# Patient Record
Sex: MALE | Race: WHITE | NOT HISPANIC OR LATINO | Employment: FULL TIME | URBAN - METROPOLITAN AREA
[De-identification: names, ages, dates, MRNs, and addresses within clinical notes are randomized per-mention and may not be internally consistent; named-entity substitution may affect disease eponyms.]

---

## 2023-07-17 PROBLEM — R05.9 COUGH: Status: ACTIVE | Noted: 2023-07-17

## 2023-07-17 PROBLEM — R06.00 DYSPNEA: Status: ACTIVE | Noted: 2023-07-17

## 2023-07-17 PROBLEM — F17.200 TOBACCO DEPENDENCE: Status: ACTIVE | Noted: 2023-07-17

## 2023-07-17 PROBLEM — J44.9 COPD (CHRONIC OBSTRUCTIVE PULMONARY DISEASE): Status: ACTIVE | Noted: 2023-07-17

## 2023-07-26 ENCOUNTER — OFFICE VISIT (OUTPATIENT)
Dept: FAMILY MEDICINE CLINIC | Facility: CLINIC | Age: 48
End: 2023-07-26
Payer: COMMERCIAL

## 2023-07-26 VITALS
OXYGEN SATURATION: 98 % | WEIGHT: 157.8 LBS | DIASTOLIC BLOOD PRESSURE: 78 MMHG | SYSTOLIC BLOOD PRESSURE: 100 MMHG | BODY MASS INDEX: 22.59 KG/M2 | HEIGHT: 70 IN | HEART RATE: 76 BPM

## 2023-07-26 DIAGNOSIS — Z76.89 ENCOUNTER TO ESTABLISH CARE: Primary | ICD-10-CM

## 2023-07-26 DIAGNOSIS — F17.200 TOBACCO DEPENDENCE: ICD-10-CM

## 2023-07-26 DIAGNOSIS — Z09 HOSPITAL DISCHARGE FOLLOW-UP: ICD-10-CM

## 2023-07-26 DIAGNOSIS — J44.9 CHRONIC OBSTRUCTIVE PULMONARY DISEASE, UNSPECIFIED COPD TYPE: ICD-10-CM

## 2023-07-26 DIAGNOSIS — J18.9 PNEUMONIA DUE TO INFECTIOUS ORGANISM, UNSPECIFIED LATERALITY, UNSPECIFIED PART OF LUNG: ICD-10-CM

## 2023-07-26 RX ORDER — NICOTINE 21 MG/24HR
1 PATCH, TRANSDERMAL 24 HOURS TRANSDERMAL EVERY 24 HOURS
Qty: 28 EACH | Refills: 0 | Status: SHIPPED | OUTPATIENT
Start: 2023-07-26

## 2023-07-26 RX ORDER — TIOTROPIUM BROMIDE INHALATION SPRAY 3.12 UG/1
2 SPRAY, METERED RESPIRATORY (INHALATION)
Qty: 8 G | Refills: 0 | COMMUNITY
Start: 2023-07-26

## 2023-07-26 NOTE — PROGRESS NOTES
Transitional Care Follow Up Visit  Subjective     Akash Lundy is a 48 y.o. male who presents for a transitional care management visit.    Within 48 business hours after discharge our office contacted him via telephone to coordinate his care and needs.      I reviewed and discussed the details of that call along with the discharge summary, hospital problems, inpatient lab results, inpatient diagnostic studies, and consultation reports with Akash.     Current outpatient and discharge medications have been reconciled for the patient.        7/18/2023     7:39 PM   Date of TCM Phone Call   [facility]   Date of Admission 7/17/2023   Date of Discharge 7/18/2023     Risk for Readmission (LACE) Score: 3 (7/18/2023  6:00 AM)      History of Present Illness  Hospital Course:  Akash Lundy is a 48 y.o. male with tobacco abuse and COPD, uses albuterol inhaler several times daily.   He presents with 2 weeks of stabbing R chest pain that waxes and wanes and is unrelated to movement.  Imaging showed R lung bleb with fluid likely sterile with  adjacent infiltrate, likely pneumonia.  Previously treated with Levaquin daily for 10 days as outpatient.  Seen by pulmonology and started on Augmentin x 30 days, also doxy x 10 days with follow-up in pulmonology clinic in 3 weeks.  Prescribed Spiriva, Symbicort and nicotine patches.    Patient presents for hospital follow-up today.  He was hospitalized at [facility] from 07/17 to 07/18.  He is present with his significant other today.  Patient reports that right sided chest discomfort has improved.  Denies shortness of breath or cough.  Compliant on all medications.  Has not smoked a cigarette since 07/17.  Not wearing patches regularly.  Unable to afford Symbicort.  Has samples for Spiriva and is using this.           Duration of Hospital Stay:  07/17 to 07/18     The following portions of the patient's history were reviewed and updated as appropriate: allergies, current  medications, past family history, past medical history, past social history, past surgical history, and problem list.    Current outpatient and discharge medications have been reconciled for the patient.  Reviewed by: Rosanne Lynch PA-C      Review of Systems   Constitutional:  Negative for activity change, appetite change, chills, diaphoresis, fatigue, fever, unexpected weight gain and unexpected weight loss.   HENT:  Negative for congestion, dental problem, ear pain, hearing loss, nosebleeds, sinus pressure, sore throat and trouble swallowing.    Eyes:  Negative for blurred vision, pain, redness and visual disturbance.   Respiratory:  Negative for apnea, cough, chest tightness, shortness of breath and wheezing.    Cardiovascular:  Positive for chest pain. Negative for palpitations and leg swelling.   Gastrointestinal:  Negative for abdominal distention, abdominal pain, anal bleeding, blood in stool, constipation, diarrhea, nausea, vomiting, GERD and indigestion.   Endocrine: Negative for cold intolerance, heat intolerance, polydipsia, polyphagia and polyuria.   Genitourinary:  Negative for decreased urine volume, difficulty urinating, dysuria, frequency, hematuria, urgency and urinary incontinence.   Musculoskeletal:  Positive for arthralgias and myalgias. Negative for gait problem, joint swelling and bursitis.   Skin:  Negative for dry skin, rash, skin lesions and wound.   Neurological:  Negative for dizziness, tremors, seizures, syncope, speech difficulty, weakness, light-headedness, headache, memory problem and confusion.   Hematological:  Does not bruise/bleed easily.   Psychiatric/Behavioral:  Negative for agitation, behavioral problems, decreased concentration, hallucinations, sleep disturbance, suicidal ideas, depressed mood and stress. The patient is not nervous/anxious.      Current Outpatient Medications on File Prior to Visit   Medication Sig Dispense Refill    albuterol sulfate  (90  Base) MCG/ACT inhaler Inhale 2 puffs Every 4 (Four) Hours As Needed for Wheezing or Shortness of Air. 6.7 g 11    amoxicillin-clavulanate (AUGMENTIN) 875-125 MG per tablet Take 1 tablet by mouth 2 (Two) Times a Day. 60 tablet 0    doxycycline (VIBRAMYCIN) 100 MG capsule Take 1 capsule by mouth 2 (Two) Times a Day for 10 days. 20 capsule 0    tiotropium (SPIRIVA) 18 MCG per inhalation capsule Place 2 capsules into inhaler and inhale 2 (Two) Times a Day. Sample from hospital, very expensive      [DISCONTINUED] nicotine (NICODERM CQ) 21 MG/24HR patch Place 1 patch on the skin as directed by provider Daily. 30 each 1    [DISCONTINUED] budesonide-formoterol (Symbicort) 160-4.5 MCG/ACT inhaler Inhale 2 puffs 2 (Two) Times a Day. 1 each 12     No current facility-administered medications on file prior to visit.       Results for orders placed or performed during the hospital encounter of 07/17/23   Blood Culture - Blood, Arm, Left    Specimen: Arm, Left; Blood   Result Value Ref Range    Blood Culture No growth at 5 days    Blood Culture - Blood, Hand, Right    Specimen: Hand, Right; Blood   Result Value Ref Range    Blood Culture No growth at 5 days    MRSA Screen, PCR (Inpatient) - Swab, Nares    Specimen: Nares; Swab   Result Value Ref Range    MRSA PCR Negative Negative   Comprehensive Metabolic Panel    Specimen: Blood   Result Value Ref Range    Glucose 141 (H) 65 - 99 mg/dL    BUN 12 6 - 20 mg/dL    Creatinine 0.77 0.76 - 1.27 mg/dL    Sodium 139 136 - 145 mmol/L    Potassium 4.4 3.5 - 5.2 mmol/L    Chloride 101 98 - 107 mmol/L    CO2 27.0 22.0 - 29.0 mmol/L    Calcium 8.8 8.6 - 10.5 mg/dL    Total Protein 6.5 6.0 - 8.5 g/dL    Albumin 4.0 3.5 - 5.2 g/dL    ALT (SGPT) 21 1 - 41 U/L    AST (SGOT) 17 1 - 40 U/L    Alkaline Phosphatase 92 39 - 117 U/L    Total Bilirubin 0.2 0.0 - 1.2 mg/dL    Globulin 2.5 gm/dL    A/G Ratio 1.6 g/dL    BUN/Creatinine Ratio 15.6 7.0 - 25.0    Anion Gap 11.0 5.0 - 15.0 mmol/L    eGFR  "109.7 >60.0 mL/min/1.73   BNP    Specimen: Blood   Result Value Ref Range    proBNP <36.0 0.0 - 450.0 pg/mL   Single High Sensitivity Troponin T    Specimen: Blood   Result Value Ref Range    HS Troponin T 12 <15 ng/L   CBC Auto Differential    Specimen: Blood   Result Value Ref Range    WBC 10.69 3.40 - 10.80 10*3/mm3    RBC 4.30 4.14 - 5.80 10*6/mm3    Hemoglobin 12.5 (L) 13.0 - 17.7 g/dL    Hematocrit 39.5 37.5 - 51.0 %    MCV 91.9 79.0 - 97.0 fL    MCH 29.1 26.6 - 33.0 pg    MCHC 31.6 31.5 - 35.7 g/dL    RDW 12.0 (L) 12.3 - 15.4 %    RDW-SD 40.8 37.0 - 54.0 fl    MPV 8.8 6.0 - 12.0 fL    Platelets 342 140 - 450 10*3/mm3    Neutrophil % 71.5 42.7 - 76.0 %    Lymphocyte % 18.1 (L) 19.6 - 45.3 %    Monocyte % 6.5 5.0 - 12.0 %    Eosinophil % 2.6 0.3 - 6.2 %    Basophil % 0.6 0.0 - 1.5 %    Immature Grans % 0.7 (H) 0.0 - 0.5 %    Neutrophils, Absolute 7.63 (H) 1.70 - 7.00 10*3/mm3    Lymphocytes, Absolute 1.94 0.70 - 3.10 10*3/mm3    Monocytes, Absolute 0.70 0.10 - 0.90 10*3/mm3    Eosinophils, Absolute 0.28 0.00 - 0.40 10*3/mm3    Basophils, Absolute 0.06 0.00 - 0.20 10*3/mm3    Immature Grans, Absolute 0.08 (H) 0.00 - 0.05 10*3/mm3    nRBC 0.0 0.0 - 0.2 /100 WBC   Lactic Acid, Plasma    Specimen: Blood   Result Value Ref Range    Lactate 1.4 0.5 - 2.0 mmol/L   ECG 12 Lead ED Triage Standing Order; SOA   Result Value Ref Range    QT Interval 398 ms    QTC Interval 447 ms   Green Top (Gel)   Result Value Ref Range    Extra Tube Hold for add-ons.    Lavender Top   Result Value Ref Range    Extra Tube hold for add-on    Gold Top - SST   Result Value Ref Range    Extra Tube Hold for add-ons.    Gray Top   Result Value Ref Range    Extra Tube Hold for add-ons.    Light Blue Top   Result Value Ref Range    Extra Tube Hold for add-ons.        Visit Vitals  /78   Pulse 76   Ht 177.8 cm (70\")   Wt 71.6 kg (157 lb 12.8 oz)   SpO2 98%   BMI 22.64 kg/m²     Body mass index is 22.64 kg/m².    Objective   Physical " Exam  Vitals reviewed.   Constitutional:       General: He is not in acute distress.     Appearance: Normal appearance. He is well-developed and normal weight. He is not ill-appearing or diaphoretic.   HENT:      Head: Normocephalic and atraumatic.   Eyes:      Extraocular Movements: Extraocular movements intact.      Conjunctiva/sclera: Conjunctivae normal.   Cardiovascular:      Rate and Rhythm: Normal rate and regular rhythm.      Heart sounds: Normal heart sounds.   Pulmonary:      Effort: Pulmonary effort is normal. No respiratory distress.      Breath sounds: Decreased breath sounds present.   Musculoskeletal:         General: Normal range of motion.      Cervical back: Normal range of motion.      Right lower leg: No edema.      Left lower leg: No edema.   Skin:     General: Skin is warm.      Findings: No erythema or rash.   Neurological:      General: No focal deficit present.      Mental Status: He is alert.   Psychiatric:         Attention and Perception: He is attentive.         Mood and Affect: Mood normal.         Speech: Speech normal.         Behavior: Behavior normal. Behavior is cooperative.         Thought Content: Thought content normal.         Judgment: Judgment normal.       Assessment & Plan   Diagnoses and all orders for this visit:    1. New patient (Primary)    2. Hospital FU    3. Pneumonia due to infectious organism, unspecified laterality, unspecified part of lung  Compliant on Augmentin and Doxycycline.  Feeling better overall.  VSS.  Denies SOB or cough.  Keep appointment with pulmonology on 08/08.    4. Chronic obstructive pulmonary disease, unspecified COPD type  -     tiotropium bromide monohydrate (Spiriva Respimat) 2.5 MCG/ACT aerosol solution inhaler; Inhale 2 puffs Daily.  Dispense: 8 g; Refill: 0  Samples of Spiriva given to patient.  Continue to use Spiriva 2 puffs daily.  Unable to afford symbicort.    5. Tobacco dependence  -     nicotine (Nicoderm CQ) 14 MG/24HR patch;  Place 1 patch on the skin as directed by provider Daily.  Dispense: 28 each; Refill: 0  -     nicotine (NICODERM CQ) 7 MG/24HR patch; Place 1 patch on the skin as directed by provider Daily.  Dispense: 28 patch; Refill: 0  Has not smoked since 07/17 which is great.  Recommend he wear patches daily to help with long-term success of quitting smoking.  Will send in next 2 doses of patches as well.             Dictated Utilizing Dragon Dictation     Please note that portions of this note were completed with a voice recognition program.     Part of this note may be an electronic transcription/translation of spoken language to printed text using the Dragon Dictation System.

## 2023-08-08 ENCOUNTER — OFFICE VISIT (OUTPATIENT)
Dept: PULMONOLOGY | Facility: CLINIC | Age: 48
End: 2023-08-08
Payer: COMMERCIAL

## 2023-08-08 VITALS
BODY MASS INDEX: 23.23 KG/M2 | TEMPERATURE: 97.6 F | HEART RATE: 65 BPM | DIASTOLIC BLOOD PRESSURE: 78 MMHG | OXYGEN SATURATION: 99 % | SYSTOLIC BLOOD PRESSURE: 110 MMHG | WEIGHT: 161.9 LBS

## 2023-08-08 DIAGNOSIS — F17.200 TOBACCO DEPENDENCE: ICD-10-CM

## 2023-08-08 DIAGNOSIS — J43.9 EMPHYSEMATOUS BLEB: Primary | ICD-10-CM

## 2023-08-08 DIAGNOSIS — J44.9 CHRONIC OBSTRUCTIVE PULMONARY DISEASE, UNSPECIFIED COPD TYPE: ICD-10-CM

## 2023-08-08 RX ORDER — FLUTICASONE FUROATE, UMECLIDINIUM BROMIDE AND VILANTEROL TRIFENATATE 100; 62.5; 25 UG/1; UG/1; UG/1
1 POWDER RESPIRATORY (INHALATION)
Qty: 1 EACH | Refills: 0 | COMMUNITY
Start: 2023-08-08

## 2023-08-08 NOTE — PROGRESS NOTES
[facility] Pulmonary Evaluation    Chief Complaint  No chief complaint on file.    Referring Provider:     Kelsea          Akash Lundy presents to [facility] PULMONARY [facility]ARE MEDICINE for posthospitalization follow-up.  He was seen in the hospital by Dr. Medel.  He was admitted for a COPD exacerbation and possible pneumonia.  A CT scan chest did show a large fluid-filled bulla.  The plan was for a course of antibiotics, Augmentin for 4 weeks, to treat infection and follow-up CT scan.      At discharge she was also prescribed Symbicort and Spiriva, unfortunately Symbicort was over $300 and he had to get samples of the Spiriva.  He has been using the Spiriva daily.  He is also been using his albuterol on occasion.    He has not been smoking for 2 weeks.  He has been using nicotine patches.  He does feel like his symptoms is also improved after quitting smoking.    He does feel like his chest pain is better.  He has had no cough or sputum production.  He denies significant dyspnea with activity.  No fevers or chills.  No hemoptysis.      He does have a history of a hospitalization many years ago with shortness of breath and was told he had chronic lung changes from fiberglass exposure.  He worked in the HVAC industry for years.      Objective     Vital Signs:   /78 (BP Location: Right arm, Patient Position: Sitting)   Pulse 65   Temp 97.6 øF (36.4 øC) (Temporal)   Wt 73.4 kg (161 lb 14.4 oz)   SpO2 99% Comment: room air  BMI 23.23 kg/mý       Immunization History   Administered Date(s) Administered    COVID-19 (PFIZER) Purple Cap Monovalent 08/11/2021, 09/17/2021       Physical Exam  Vitals reviewed.   Constitutional:       Appearance: He is well-developed.   HENT:      Head: Normocephalic and atraumatic.   Eyes:      Pupils: Pupils are equal, round, and reactive to light.   Cardiovascular:      Rate and Rhythm: Normal rate and regular rhythm.      Heart sounds: No murmur  heard.  Pulmonary:      Effort: Pulmonary effort is normal. No respiratory distress.      Breath sounds: Normal breath sounds. No wheezing or rales.   Abdominal:      General: Bowel sounds are normal. There is no distension.      Palpations: Abdomen is soft.   Musculoskeletal:         General: Normal range of motion.      Cervical back: Normal range of motion and neck supple.   Skin:     General: Skin is warm and dry.      Findings: No erythema.   Neurological:      Mental Status: He is alert and oriented to person, place, and time.   Psychiatric:         Behavior: Behavior normal.        Result Review :                           Assessment and Plan    Problem List Items Addressed This Visit          Pulmonary and Pneumonias    COPD (chronic obstructive pulmonary disease)    Relevant Medications    Fluticasone-Umeclidin-Vilant (Trelegy Ellipta) 100-62.5-25 MCG/ACT inhaler    Emphysematous bleb - Primary    Overview     CT with blebs and fluid filled on the right         Relevant Medications    Fluticasone-Umeclidin-Vilant (Trelegy Ellipta) 100-62.5-25 MCG/ACT inhaler    Other Relevant Orders    CT Chest Without Contrast       Tobacco    Tobacco dependence       He is here today for follow-up after his hospitalization.  Fortunately his chest pain has significantly improved and he stopped smoking.    Since the Symbicort and Spiriva was quite high I will give him some samples of Trelegy today in the office.  I would like for him to follow up with full PFT on his next visit to get a better idea of his underlying lung disease.    In regards to his fluid-filled bulla, he will complete his 4 weeks of Augmentin on the 18th of this month.  After that we will follow-up on a CT scan chest for further evaluation and recommendations with Dr. Medel.    We did discuss alpha-1 antitrypsin testing today and he is agreeable to have that done.  He does not have any significant family history of lung disease.        I spent 38 minutes  caring for Akash on this date of service. This time includes time spent by me in the following activities:preparing for the visit, reviewing tests, obtaining and/or reviewing a separately obtained history, performing a medically appropriate examination and/or evaluation , counseling and educating the patient/family/caregiver, ordering medications, tests, or procedures, referring and communicating with other health care professionals , documenting information in the medical record, and independently interpreting results and communicating that information with the patient/family/caregiver    Follow Up     Return in about 4 weeks (around 9/5/2023).  Patient was given instructions and counseling regarding his condition or for health maintenance advice. Please see specific information pulled into the AVS if appropriate.     MATIAS Lai, ACNP  [facility] Pulmonary Critical Care Medicine  Electronically signed

## 2023-09-06 ENCOUNTER — OFFICE VISIT (OUTPATIENT)
Dept: PULMONOLOGY | Facility: CLINIC | Age: 48
End: 2023-09-06
Payer: COMMERCIAL

## 2023-09-06 VITALS
HEIGHT: 70 IN | DIASTOLIC BLOOD PRESSURE: 82 MMHG | WEIGHT: 167 LBS | TEMPERATURE: 97.6 F | BODY MASS INDEX: 23.91 KG/M2 | OXYGEN SATURATION: 97 % | SYSTOLIC BLOOD PRESSURE: 110 MMHG | HEART RATE: 82 BPM

## 2023-09-06 DIAGNOSIS — J44.9 CHRONIC OBSTRUCTIVE PULMONARY DISEASE, UNSPECIFIED COPD TYPE: Primary | ICD-10-CM

## 2023-09-06 DIAGNOSIS — F17.200 TOBACCO DEPENDENCE: ICD-10-CM

## 2023-09-06 DIAGNOSIS — J43.9 EMPHYSEMATOUS BLEB: ICD-10-CM

## 2023-09-06 RX ORDER — ALBUTEROL SULFATE 90 UG/1
4 AEROSOL, METERED RESPIRATORY (INHALATION) ONCE
Status: COMPLETED | OUTPATIENT
Start: 2023-09-06 | End: 2023-09-06

## 2023-09-06 RX ADMIN — ALBUTEROL SULFATE 4 PUFF: 90 AEROSOL, METERED RESPIRATORY (INHALATION) at 11:36

## 2023-09-06 NOTE — PROGRESS NOTES
"[facility] Pulmonary Follow up      Chief Complaint  Emphysematous bleb and Follow-up    Subjective          Akash Lundy presents to [facility] PULMONARY & CRITICAL CARE MEDICINE for follow-up CT scan the chest.  With him following him after hospitalization for what appeared to be an infected bleb.  He completed a prolonged course of antibiotics.    He did quit smoking about 6 weeks ago.  He is using nicotine patches.  He has noticed an improvement in his shortness of breath and cough while not smoking.  He is on Trelegy daily.    He does remain quite active.  He continues to work daily, walking several miles a day.  He is also very active hiking and camping.      Objective     Vital Signs:   /82   Pulse 82   Temp 97.6 °F (36.4 °C)   Ht 177.8 cm (70\")   Wt 75.8 kg (167 lb)   SpO2 97% Comment: resting, room air  BMI 23.96 kg/m²       Immunization History   Administered Date(s) Administered    COVID-19 (PFIZER) Purple Cap Monovalent 08/11/2021, 09/17/2021    Influenza Seasonal Injectable 10/09/2018       Physical Exam  Vitals reviewed.   Constitutional:       General: He is not in acute distress.     Appearance: He is well-developed.   HENT:      Head: Normocephalic and atraumatic.   Eyes:      Pupils: Pupils are equal, round, and reactive to light.   Cardiovascular:      Rate and Rhythm: Normal rate and regular rhythm.      Heart sounds: No murmur heard.  Pulmonary:      Effort: Pulmonary effort is normal. No respiratory distress.      Breath sounds: Rales (increased right lower lobe) present. No wheezing.   Abdominal:      General: Bowel sounds are normal. There is no distension.      Palpations: Abdomen is soft.   Musculoskeletal:         General: Normal range of motion.      Cervical back: Normal range of motion and neck supple.   Skin:     General: Skin is warm and dry.      Findings: No erythema.   Neurological:      Mental Status: He is alert and oriented to person, place, and " time.   Psychiatric:         Behavior: Behavior normal.        Result Review :            PFTs done in the office today:  Moderate obstructive airway disease with an FEV1 of 2.72, 70% predicted.  No restriction.  Normal DLCO.                   Assessment and Plan    Problem List Items Addressed This Visit          Pulmonary and Pneumonias    COPD (chronic obstructive pulmonary disease) - Primary    Emphysematous bleb    Overview     CT with blebs and fluid filled on the right            Tobacco    Tobacco dependence       He was following up today on his CT scan, unfortunately his CT scan is not scheduled until Friday.  I will call him with an update on the report to make sure that fluid-filled bleb has improved.    He does have underlying COPD, he is done better since he stopped smoking.  We did review his pulmonary function tests.  He is not using his rescue inhaler nearly as much.  He is currently on Trelegy daily.    I encouraged him on his smoking cessation efforts.    Follow Up     Return in about 6 months (around 3/6/2024).  Patient was given instructions and counseling regarding his condition or for health maintenance advice. Please see specific information pulled into the AVS if appropriate.     I spent 34 minutes caring for Akash on this date of service. This time includes time spent by me in the following activities:preparing for the visit, reviewing tests, obtaining and/or reviewing a separately obtained history, performing a medically appropriate examination and/or evaluation , counseling and educating the patient/family/caregiver, ordering medications, tests, or procedures, documenting information in the medical record, and independently interpreting results and communicating that information with the patient/family/caregiver    Excluding time spent on other separate services such as performing procedures or test interpretation, if applicable      Holly Gomez APRN, ACNP  [facility] Pulmonary Critical  Care Medicine  Electronically signed

## 2023-09-08 ENCOUNTER — HOSPITAL ENCOUNTER (OUTPATIENT)
Dept: CT IMAGING | Facility: HOSPITAL | Age: 48
Discharge: HOME | End: 2023-09-08
Admitting: NURSE PRACTITIONER
Payer: COMMERCIAL

## 2023-09-08 DIAGNOSIS — J43.9 EMPHYSEMATOUS BLEB: ICD-10-CM

## 2023-09-08 PROCEDURE — 71250 CT THORAX DX C-: CPT

## 2023-09-11 ENCOUNTER — TELEPHONE (OUTPATIENT)
Dept: PULMONOLOGY | Facility: CLINIC | Age: 48
End: 2023-09-11
Payer: COMMERCIAL

## 2023-09-11 DIAGNOSIS — J43.9 EMPHYSEMATOUS BLEB: Primary | ICD-10-CM

## 2023-09-11 DIAGNOSIS — R91.8 ABNORMAL CT SCAN OF LUNG: ICD-10-CM

## 2023-09-11 NOTE — TELEPHONE ENCOUNTER
I spoke to  Kamron regarding his CT results, that area of concern has decreased in size with less fluid.  We will continue with serial CT follow-ups in 3 months.

## 2023-11-06 ENCOUNTER — OFFICE VISIT (OUTPATIENT)
Dept: FAMILY MEDICINE CLINIC | Facility: CLINIC | Age: 48
End: 2023-11-06
Payer: COMMERCIAL

## 2023-11-06 VITALS
SYSTOLIC BLOOD PRESSURE: 118 MMHG | BODY MASS INDEX: 24.25 KG/M2 | DIASTOLIC BLOOD PRESSURE: 72 MMHG | WEIGHT: 169.4 LBS | HEART RATE: 92 BPM | HEIGHT: 70 IN | OXYGEN SATURATION: 97 %

## 2023-11-06 DIAGNOSIS — Z12.11 SCREEN FOR COLON CANCER: ICD-10-CM

## 2023-11-06 DIAGNOSIS — J43.9 PULMONARY EMPHYSEMA, UNSPECIFIED EMPHYSEMA TYPE: ICD-10-CM

## 2023-11-06 DIAGNOSIS — Z11.59 ENCOUNTER FOR HEPATITIS C SCREENING TEST FOR LOW RISK PATIENT: ICD-10-CM

## 2023-11-06 DIAGNOSIS — Z13.29 SCREENING FOR THYROID DISORDER: ICD-10-CM

## 2023-11-06 DIAGNOSIS — Z13.220 SCREENING FOR CHOLESTEROL LEVEL: ICD-10-CM

## 2023-11-06 DIAGNOSIS — R73.02 IMPAIRED GLUCOSE TOLERANCE: ICD-10-CM

## 2023-11-06 DIAGNOSIS — Z00.00 PHYSICAL EXAM, ANNUAL: Primary | ICD-10-CM

## 2023-11-06 DIAGNOSIS — Z23 IMMUNIZATION DUE: ICD-10-CM

## 2023-11-06 RX ORDER — LEVOCETIRIZINE DIHYDROCHLORIDE 5 MG/1
5 TABLET, FILM COATED ORAL EVERY EVENING
COMMUNITY

## 2023-11-06 NOTE — LETTER
[facility]  Vaccine Consent Form    Patient Name:  Akash Lundy  Patient :  1975     Vaccine(s) Ordered    Pneumococcal Conjugate Vaccine 20-Valent All        Screening Checklist  The following questions should be completed prior to vaccination. If you answer “yes” to any question, it does not necessarily mean you should not be vaccinated. It just means we may need to clarify or ask more questions. If a question is unclear, please ask your healthcare provider to explain it.    Yes No   Any fever or moderate to severe illness today (mild illness and/or antibiotic treatment are not contraindications)?     Do you have a history of a serious reaction to any previous vaccinations, such as anaphylaxis, encephalopathy within 7 days, Guillain-Claremont syndrome within 6 weeks, seizure?     Have you received any live vaccine(s) in the past month (MMR, SKINNY)?     Do you have an anaphylactic allergy to latex (DTaP, DTaP-IPV, Hep A, Hep B, MenB, RV, Td, Tdap), baker’s yeast (Hep B, HPV), or gelatin (SKINNY, MMR)?     Do you have an anaphylactic allergy to neomycin (Rabies, SKINNY, MMR, IPV, Hep A), polymyxin B (IPV), or streptomycin (IPV)?      Any cancer, leukemia, AIDS, or other immune system disorder? (SKINNY, MMR, RV)     Do you have a parent, brother, or sister with an immune system problem (if immune competence of vaccine recipient clinically verified, can proceed)? (MMR, SKINNY)     Any recent steroid treatments for >2 weeks, chemotherapy, or radiation treatment? (SKINNY, MMR)     Have you received antibody-containing blood transfusions or IVIG in the past 11 months (recommended interval is dependent on product)? (MMR, SKINNY)     Have you taken antiviral drugs (acyclovir, famciclovir, valacyclovir) in the last 24 or 48 hours, respectively (SKINNY)?      Are you pregnant or planning to become pregnant within 1 month? (SKINNY, MMR, HPV, IPV, MenB; For hep B- refer to Engerix-B)     For infants, have you ever been told your child has  had intussusception or a medical emergency involving obstruction of the intestine (RV)? If not for an infant, can skip this question.         *Ordering Physician/APC should be consulted if “yes” is checked by the patient or guardian above.      I have received, read, and understand the Vaccine Information Statement (VIS) for each vaccine ordered above.  I have considered my health status as well as the health status of my close contacts.  I have taken the opportunity to discuss my vaccine questions with my health care provider.   I have requested that the ordered vaccine(s) be given to me.  I understand the benefits and risks of the vaccines.  I understand that I should remain in the clinic for 15 minutes after receiving the vaccine(s).  _________________________________________________________  Signature of Patient or Parent/Legal Guardian ____________________  Date

## 2023-11-06 NOTE — PROGRESS NOTES
Chief Complaint   Patient presents with    Annual Exam       Akash Lundy is a pleasant 48 y.o. male who is here for annual physical exam.  Patient is established with pulmonology who is managing his COPD.  He continues to refrain from smoking cigarettes or vaping since July.  He is no longer using nicotine patches.  He is doing well without the cigarettes.  He denies cough or worsening shortness of breath today.  Using Trelegy inhaler which is working well for him.  The Trelegy inhaler is expensive even with insurance coverage and he will speak with pulmonology regarding this.  Taking over-the-counter allergy medications.  Not going to ophthalmologist - denies vision issues.  Going to dentist regularly.  Due for colonoscopy.  Reviewed vaccinations with patient.    Past Medical History:   Diagnosis Date    COPD (chronic obstructive pulmonary disease)        History reviewed. No pertinent surgical history.    History reviewed. No pertinent family history.    Social History     Socioeconomic History    Marital status:    Tobacco Use    Smoking status: Former     Packs/day: 1.00     Years: 30.00     Additional pack years: 0.00     Total pack years: 30.00     Types: Cigarettes     Quit date: 2023     Years since quittin.2    Smokeless tobacco: Never    Tobacco comments:     Pt stopped in 2023   Vaping Use    Vaping Use: Never used   Substance and Sexual Activity    Alcohol use: Yes     Comment: occ    Drug use: Not Currently    Sexual activity: Defer       No Known Allergies    ROS  Review of Systems   Constitutional:  Negative for chills, diaphoresis, fatigue and fever.   HENT:  Negative for congestion, ear pain, hearing loss, postnasal drip, rhinorrhea and sore throat.    Eyes:  Negative for blurred vision, pain and visual disturbance.   Respiratory:  Positive for cough and shortness of breath. Negative for wheezing.    Cardiovascular:  Negative for chest pain and leg swelling.  "  Gastrointestinal:  Negative for abdominal pain, blood in stool, constipation, diarrhea, nausea, vomiting and indigestion.   Endocrine: Negative for polyuria.   Genitourinary:  Negative for dysuria, flank pain and hematuria.   Musculoskeletal:  Negative for arthralgias, gait problem and myalgias.   Skin:  Negative for rash and skin lesions.   Neurological:  Negative for dizziness, weakness, light-headedness, numbness and headache.   Psychiatric/Behavioral:  Positive for stress. Negative for self-injury, sleep disturbance, suicidal ideas and depressed mood. The patient is not nervous/anxious.        Vitals:    11/06/23 0950   BP: 118/72   Pulse: 92   SpO2: 97%   Weight: 76.8 kg (169 lb 6.4 oz)   Height: 177.8 cm (70\")     Body mass index is 24.31 kg/m².    BMI is within normal parameters. No other follow-up for BMI required.       Current Outpatient Medications on File Prior to Visit   Medication Sig Dispense Refill    albuterol sulfate  (90 Base) MCG/ACT inhaler Inhale 2 puffs Every 4 (Four) Hours As Needed for Wheezing or Shortness of Air. 6.7 g 11    Fluticasone-Umeclidin-Vilant (Trelegy Ellipta) 100-62.5-25 MCG/ACT inhaler Inhale 1 puff Daily. 1 each 0    levocetirizine (XYZAL) 5 MG tablet Take 1 tablet by mouth Every Evening.      [DISCONTINUED] nicotine (Nicoderm CQ) 14 MG/24HR patch Place 1 patch on the skin as directed by provider Daily. 28 each 0    [DISCONTINUED] nicotine (NICODERM CQ) 7 MG/24HR patch Place 1 patch on the skin as directed by provider Daily. 28 patch 0     No current facility-administered medications on file prior to visit.       Results for orders placed or performed during the hospital encounter of 09/21/23   Covid-19 + Flu A&B AG, Veritor [id number]    Specimen: Swab   Result Value Ref Range    SARS Antigen Not Detected Not Detected, Presumptive Negative    Influenza A Antigen TEZ Not Detected Not Detected    Influenza B Antigen TEZ Not Detected Not Detected    Internal Control " Passed Passed    Lot Number [id number]     Expiration Date 4,102,024        PE    BMI is within normal parameters. No other follow-up for BMI required.      Physical Exam  Vitals reviewed.   Constitutional:       General: He is not in acute distress.     Appearance: Normal appearance. He is well-developed and normal weight. He is not ill-appearing or diaphoretic.   HENT:      Head: Normocephalic and atraumatic.      Right Ear: Hearing, tympanic membrane, ear canal and external ear normal.      Left Ear: Hearing, tympanic membrane, ear canal and external ear normal.      Nose: Nose normal.      Right Sinus: No maxillary sinus tenderness or frontal sinus tenderness.      Left Sinus: No maxillary sinus tenderness or frontal sinus tenderness.      Mouth/Throat:      Pharynx: Uvula midline.   Eyes:      General: Lids are normal.      Extraocular Movements: Extraocular movements intact.      Conjunctiva/sclera: Conjunctivae normal.   Neck:      Thyroid: No thyroid mass or thyromegaly.      Trachea: Trachea and phonation normal.   Cardiovascular:      Rate and Rhythm: Normal rate and regular rhythm.      Heart sounds: Normal heart sounds.   Pulmonary:      Effort: Pulmonary effort is normal. No respiratory distress.      Breath sounds: Decreased breath sounds present.   Abdominal:      General: Bowel sounds are normal. There is no distension.      Palpations: Abdomen is soft. Abdomen is not rigid.      Tenderness: There is no abdominal tenderness. There is no guarding.   Musculoskeletal:         General: Normal range of motion.      Cervical back: Normal range of motion.      Right lower leg: No edema.      Left lower leg: No edema.   Lymphadenopathy:      Cervical: No cervical adenopathy.      Right cervical: No superficial cervical adenopathy.     Left cervical: No superficial cervical adenopathy.   Skin:     General: Skin is warm.      Findings: No erythema or rash.      Nails: There is no clubbing.   Neurological:       Mental Status: He is alert and oriented to person, place, and time.      Coordination: Coordination normal.      Gait: Gait normal.      Deep Tendon Reflexes: Reflexes are normal and symmetric.      Comments: CN grossly intact   Psychiatric:         Attention and Perception: Attention and perception normal. He is attentive.         Mood and Affect: Mood and affect normal.         Speech: Speech normal.         Behavior: Behavior normal. Behavior is cooperative.         Thought Content: Thought content normal.         Cognition and Memory: Cognition and memory normal.         Judgment: Judgment normal.         A/P    Diagnoses and all orders for this visit:    1. Physical exam, annual (Primary)  -     TSH Rfx On Abnormal To Free T4; Future  -     Lipid Panel; Future  -     Hemoglobin A1c; Future  -     Hepatitis C Antibody; Future  PE completed  Preventative labs ordered  Colonoscopy - referral placed  Dentist - going regularly  Ophthalmologist - encouraged patient to make an appointment  Vaccinations discussed    2. Pulmonary emphysema, unspecified emphysema type  Followed by pulmonology.  Using trelegy which is helpful.  Gave samples today.  Cost is prohibitive.  Patient will talk to pulmonology regarding this.    3. Impaired glucose tolerance  -     Hemoglobin A1c; Future    4. Screening for thyroid disorder  -     TSH Rfx On Abnormal To Free T4; Future    5. Screening for cholesterol level  -     Lipid Panel; Future    6. Screen for colon cancer  -     Ambulatory Referral For Screening Colonoscopy    7. Encounter for hepatitis C screening test for low risk patient  -     Hepatitis C Antibody; Future    8. Immunization due  -     Pneumococcal Conjugate Vaccine 20-Valent All         Plan of care reviewed with patient at the conclusion of today's visit. Education was provided regarding nutrition , exercise, weight management, supplements, preventative screenings, and vaccinations diagnosis, management and any  prescribed or recommended OTC medications.  Patient verbalizes understanding of and agreement with management plan.    Dictated Utilizing Dragon Dictation     Please note that portions of this note were completed with a voice recognition program.     Part of this note may be an electronic transcription/translation of spoken language to printed text using the Dragon Dictation System.    Return in about 1 year (around 11/6/2024) for Annual physical.     Rosanne Lynch PA-C

## 2023-11-29 ENCOUNTER — TELEPHONE (OUTPATIENT)
Dept: PULMONOLOGY | Facility: CLINIC | Age: 48
End: 2023-11-29
Payer: COMMERCIAL

## 2023-11-29 NOTE — TELEPHONE ENCOUNTER
Called pt LVM requesting a call back. Office # given. Pt will need to stop by the office and repeat Alpha 1 Swab due to company not receiving test. No apt needed.

## 2024-11-06 ENCOUNTER — LAB (OUTPATIENT)
Dept: LAB | Facility: HOSPITAL | Age: 49
End: 2024-11-06
Payer: COMMERCIAL

## 2024-11-06 ENCOUNTER — OFFICE VISIT (OUTPATIENT)
Dept: FAMILY MEDICINE CLINIC | Facility: CLINIC | Age: 49
End: 2024-11-06
Payer: COMMERCIAL

## 2024-11-06 VITALS
SYSTOLIC BLOOD PRESSURE: 100 MMHG | WEIGHT: 179 LBS | HEIGHT: 70 IN | HEART RATE: 71 BPM | BODY MASS INDEX: 25.62 KG/M2 | DIASTOLIC BLOOD PRESSURE: 74 MMHG | OXYGEN SATURATION: 97 %

## 2024-11-06 DIAGNOSIS — Z13.29 SCREENING FOR THYROID DISORDER: ICD-10-CM

## 2024-11-06 DIAGNOSIS — J43.2 CENTRILOBULAR EMPHYSEMA: ICD-10-CM

## 2024-11-06 DIAGNOSIS — Z13.1 SCREENING FOR DIABETES MELLITUS: ICD-10-CM

## 2024-11-06 DIAGNOSIS — Z12.5 PROSTATE CANCER SCREENING: ICD-10-CM

## 2024-11-06 DIAGNOSIS — Z00.00 PHYSICAL EXAM, ANNUAL: Primary | ICD-10-CM

## 2024-11-06 DIAGNOSIS — Z12.11 SCREEN FOR COLON CANCER: ICD-10-CM

## 2024-11-06 DIAGNOSIS — Z00.00 PHYSICAL EXAM, ANNUAL: ICD-10-CM

## 2024-11-06 DIAGNOSIS — F17.210 CIGARETTE SMOKER: ICD-10-CM

## 2024-11-06 DIAGNOSIS — Z12.2 ENCOUNTER FOR SCREENING FOR LUNG CANCER: ICD-10-CM

## 2024-11-06 DIAGNOSIS — Z11.59 ENCOUNTER FOR HEPATITIS C SCREENING TEST FOR LOW RISK PATIENT: ICD-10-CM

## 2024-11-06 DIAGNOSIS — Z13.0 SCREENING FOR DEFICIENCY ANEMIA: ICD-10-CM

## 2024-11-06 DIAGNOSIS — Z80.1 FAMILY HISTORY OF LUNG CANCER: ICD-10-CM

## 2024-11-06 DIAGNOSIS — Z13.220 SCREENING FOR CHOLESTEROL LEVEL: ICD-10-CM

## 2024-11-06 LAB
ALBUMIN SERPL-MCNC: 4.4 G/DL (ref 3.5–5.2)
ALBUMIN/GLOB SERPL: 1.6 G/DL
ALP SERPL-CCNC: 90 U/L (ref 39–117)
ALT SERPL W P-5'-P-CCNC: 43 U/L (ref 1–41)
ANION GAP SERPL CALCULATED.3IONS-SCNC: 12 MMOL/L (ref 5–15)
AST SERPL-CCNC: 33 U/L (ref 1–40)
BASOPHILS # BLD AUTO: 0.05 10*3/MM3 (ref 0–0.2)
BASOPHILS NFR BLD AUTO: 0.7 % (ref 0–1.5)
BILIRUB SERPL-MCNC: 0.4 MG/DL (ref 0–1.2)
BUN SERPL-MCNC: 14 MG/DL (ref 6–20)
BUN/CREAT SERPL: 13.5 (ref 7–25)
CALCIUM SPEC-SCNC: 9.4 MG/DL (ref 8.6–10.5)
CHLORIDE SERPL-SCNC: 103 MMOL/L (ref 98–107)
CHOLEST SERPL-MCNC: 205 MG/DL (ref 0–200)
CO2 SERPL-SCNC: 25 MMOL/L (ref 22–29)
CREAT SERPL-MCNC: 1.04 MG/DL (ref 0.76–1.27)
DEPRECATED RDW RBC AUTO: 37.5 FL (ref 37–54)
EGFRCR SERPLBLD CKD-EPI 2021: 87.5 ML/MIN/1.73
EOSINOPHIL # BLD AUTO: 0.24 10*3/MM3 (ref 0–0.4)
EOSINOPHIL NFR BLD AUTO: 3.5 % (ref 0.3–6.2)
ERYTHROCYTE [DISTWIDTH] IN BLOOD BY AUTOMATED COUNT: 12.3 % (ref 12.3–15.4)
GLOBULIN UR ELPH-MCNC: 2.7 GM/DL
GLUCOSE SERPL-MCNC: 84 MG/DL (ref 65–99)
HCT VFR BLD AUTO: 40.7 % (ref 37.5–51)
HCV AB SER QL: NORMAL
HDLC SERPL-MCNC: 39 MG/DL (ref 40–60)
HGB BLD-MCNC: 13.9 G/DL (ref 13–17.7)
IMM GRANULOCYTES # BLD AUTO: 0.03 10*3/MM3 (ref 0–0.05)
IMM GRANULOCYTES NFR BLD AUTO: 0.4 % (ref 0–0.5)
LDLC SERPL CALC-MCNC: 141 MG/DL (ref 0–100)
LDLC/HDLC SERPL: 3.55 {RATIO}
LYMPHOCYTES # BLD AUTO: 2.13 10*3/MM3 (ref 0.7–3.1)
LYMPHOCYTES NFR BLD AUTO: 31.4 % (ref 19.6–45.3)
MCH RBC QN AUTO: 29 PG (ref 26.6–33)
MCHC RBC AUTO-ENTMCNC: 34.2 G/DL (ref 31.5–35.7)
MCV RBC AUTO: 85 FL (ref 79–97)
MONOCYTES # BLD AUTO: 0.62 10*3/MM3 (ref 0.1–0.9)
MONOCYTES NFR BLD AUTO: 9.1 % (ref 5–12)
NEUTROPHILS NFR BLD AUTO: 3.71 10*3/MM3 (ref 1.7–7)
NEUTROPHILS NFR BLD AUTO: 54.9 % (ref 42.7–76)
NRBC BLD AUTO-RTO: 0 /100 WBC (ref 0–0.2)
PLATELET # BLD AUTO: 266 10*3/MM3 (ref 140–450)
PMV BLD AUTO: 10 FL (ref 6–12)
POTASSIUM SERPL-SCNC: 4.4 MMOL/L (ref 3.5–5.2)
PROT SERPL-MCNC: 7.1 G/DL (ref 6–8.5)
PSA SERPL-MCNC: 0.63 NG/ML (ref 0–4)
RBC # BLD AUTO: 4.79 10*6/MM3 (ref 4.14–5.8)
SODIUM SERPL-SCNC: 140 MMOL/L (ref 136–145)
TRIGL SERPL-MCNC: 138 MG/DL (ref 0–150)
TSH SERPL DL<=0.05 MIU/L-ACNC: 3.44 UIU/ML (ref 0.27–4.2)
VLDLC SERPL-MCNC: 25 MG/DL (ref 5–40)
WBC NRBC COR # BLD AUTO: 6.78 10*3/MM3 (ref 3.4–10.8)

## 2024-11-06 PROCEDURE — 99396 PREV VISIT EST AGE 40-64: CPT | Performed by: PHYSICIAN ASSISTANT

## 2024-11-06 PROCEDURE — 84443 ASSAY THYROID STIM HORMONE: CPT

## 2024-11-06 PROCEDURE — G0103 PSA SCREENING: HCPCS

## 2024-11-06 PROCEDURE — 82104 ALPHA-1-ANTITRYPSIN PHENO: CPT

## 2024-11-06 PROCEDURE — 82103 ALPHA-1-ANTITRYPSIN TOTAL: CPT

## 2024-11-06 PROCEDURE — 80053 COMPREHEN METABOLIC PANEL: CPT

## 2024-11-06 PROCEDURE — 86803 HEPATITIS C AB TEST: CPT

## 2024-11-06 PROCEDURE — 80061 LIPID PANEL: CPT

## 2024-11-06 PROCEDURE — 85025 COMPLETE CBC W/AUTO DIFF WBC: CPT

## 2024-11-06 RX ORDER — ALBUTEROL SULFATE 90 UG/1
2 INHALANT RESPIRATORY (INHALATION) EVERY 4 HOURS PRN
Qty: 6.7 G | Refills: 11 | Status: SHIPPED | OUTPATIENT
Start: 2024-11-06

## 2024-11-06 RX ORDER — FLUTICASONE PROPIONATE AND SALMETEROL 250; 50 UG/1; UG/1
1 POWDER RESPIRATORY (INHALATION)
Qty: 60 EACH | Refills: 11 | Status: SHIPPED | OUTPATIENT
Start: 2024-11-06

## 2024-11-06 NOTE — PROGRESS NOTES
Chief Complaint   Patient presents with    Annual Exam    Med Refill     Inhalers       Akash Lundy is a pleasant 49 y.o. male who is here for annual physical exam.  Patient is doing well overall.  Has not been to pulmonology in over a year.  Has no plan to return at this time.  He is no longer smoking cigarettes.  Did get benefit from Trelegy but it was $150/month.  Interested in trying something else.  He is due for CT chest lung cancer screening and is willing to proceed with this.  Overdue for colonoscopy.  No family history of colon cancer or polyps.  Prefers to avoid colonoscopy and would like to do cologuard.  Bowel movements normal.  No skin lesions/moles of concern.  Overdue for ophthalmology and dentist.  Reviewed vaccinations.      Past Medical History:   Diagnosis Date    COPD (chronic obstructive pulmonary disease)        History reviewed. No pertinent surgical history.    History reviewed. No pertinent family history.    Social History     Socioeconomic History    Marital status:    Tobacco Use    Smoking status: Former     Current packs/day: 0.00     Average packs/day: 1 pack/day for 30.0 years (30.0 ttl pk-yrs)     Types: Cigarettes     Start date: 1993     Quit date: 2023     Years since quittin.2    Smokeless tobacco: Never    Tobacco comments:     Pt stopped in 2023   Vaping Use    Vaping status: Never Used   Substance and Sexual Activity    Alcohol use: Yes     Comment: occ    Drug use: Not Currently    Sexual activity: Defer       No Known Allergies    ROS  Review of Systems   Constitutional:  Negative for chills, diaphoresis, fatigue and fever.   HENT:  Negative for congestion, ear pain, hearing loss, postnasal drip, rhinorrhea and sore throat.    Eyes:  Negative for blurred vision, pain and visual disturbance.   Respiratory:  Positive for shortness of breath. Negative for cough and wheezing.    Cardiovascular:  Negative for chest pain and leg swelling.  "  Gastrointestinal:  Negative for abdominal pain, blood in stool, constipation, diarrhea, nausea, vomiting and indigestion.   Endocrine: Negative for polyuria.   Genitourinary:  Negative for dysuria, flank pain and hematuria.   Musculoskeletal:  Negative for arthralgias, gait problem and myalgias.   Skin:  Negative for rash and skin lesions.   Neurological:  Negative for dizziness, weakness, light-headedness, numbness and headache.   Psychiatric/Behavioral:  Negative for self-injury, sleep disturbance, suicidal ideas, depressed mood and stress. The patient is not nervous/anxious.        Vitals:    11/06/24 1012   BP: 100/74   BP Location: Left arm   Patient Position: Sitting   Cuff Size: Adult   Pulse: 71   SpO2: 97%   Weight: 81.2 kg (179 lb)   Height: 177.8 cm (70\")     Body mass index is 25.68 kg/m².    BMI is >= 25 and <30. (Overweight) The following options were offered after discussion;: exercise counseling/recommendations and nutrition counseling/recommendations       Current Outpatient Medications on File Prior to Visit   Medication Sig Dispense Refill    levocetirizine (XYZAL) 5 MG tablet Take 1 tablet by mouth Every Evening.      [DISCONTINUED] albuterol sulfate  (90 Base) MCG/ACT inhaler Inhale 2 puffs Every 4 (Four) Hours As Needed for Wheezing or Shortness of Air. 6.7 g 11    [DISCONTINUED] Fluticasone-Umeclidin-Vilant (Trelegy Ellipta) 100-62.5-25 MCG/ACT inhaler Inhale 1 puff Daily. 1 each 0     No current facility-administered medications on file prior to visit.       Results for orders placed or performed during the hospital encounter of 09/21/23   Covid-19 + Flu A&B AG, Veritor [id number]    Collection Time: 09/21/23 10:10 AM    Specimen: Swab   Result Value Ref Range    SARS Antigen Not Detected Not Detected, Presumptive Negative    Influenza A Antigen TEZ Not Detected Not Detected    Influenza B Antigen TEZ Not Detected Not Detected    Internal Control Passed Passed    Lot Number [id number]  "    Expiration Date 4,102,024        PE    Physical Exam  Vitals reviewed.   Constitutional:       General: He is not in acute distress.     Appearance: Normal appearance. He is well-developed and normal weight. He is not ill-appearing or diaphoretic.   HENT:      Head: Normocephalic and atraumatic.      Right Ear: Hearing, tympanic membrane, ear canal and external ear normal.      Left Ear: Hearing, tympanic membrane, ear canal and external ear normal.      Nose: Nose normal.      Right Sinus: No maxillary sinus tenderness or frontal sinus tenderness.      Left Sinus: No maxillary sinus tenderness or frontal sinus tenderness.      Mouth/Throat:      Pharynx: Uvula midline.   Eyes:      General: Lids are normal.      Extraocular Movements: Extraocular movements intact.      Conjunctiva/sclera: Conjunctivae normal.   Neck:      Thyroid: No thyroid mass or thyromegaly.      Trachea: Trachea and phonation normal.   Cardiovascular:      Rate and Rhythm: Normal rate and regular rhythm.      Heart sounds: Normal heart sounds.   Pulmonary:      Effort: Pulmonary effort is normal.      Breath sounds: Decreased breath sounds present. No wheezing.   Abdominal:      General: Bowel sounds are normal. There is no distension.      Palpations: Abdomen is soft. Abdomen is not rigid.      Tenderness: There is no abdominal tenderness. There is no guarding.   Musculoskeletal:         General: Normal range of motion.      Cervical back: Normal range of motion.      Right lower leg: No edema.      Left lower leg: No edema.   Lymphadenopathy:      Cervical: No cervical adenopathy.      Right cervical: No superficial cervical adenopathy.     Left cervical: No superficial cervical adenopathy.   Skin:     General: Skin is warm.      Findings: No erythema or rash.      Nails: There is no clubbing.   Neurological:      Mental Status: He is alert and oriented to person, place, and time.      Coordination: Coordination normal.      Gait: Gait  normal.      Deep Tendon Reflexes: Reflexes are normal and symmetric.      Comments: CN grossly intact   Psychiatric:         Attention and Perception: Attention and perception normal. He is attentive.         Mood and Affect: Mood and affect normal.         Speech: Speech normal.         Behavior: Behavior normal. Behavior is cooperative.         Thought Content: Thought content normal.         Cognition and Memory: Cognition and memory normal.         Judgment: Judgment normal.         A/P    Diagnoses and all orders for this visit:    1. Physical exam, annual (Primary)  -     CBC Auto Differential; Future  -     Comprehensive Metabolic Panel; Future  -     TSH Rfx On Abnormal To Free T4; Future  -     PSA Screen; Future  -     Lipid Panel; Future  PE completed  Preventative labs ordered  Colonoscopy declined, cologuard ordered.  Dentist - encouraged to go regularly  Ophthalmologist - encouraged to go regularly  Dermatologist - denies any moles/lesions of concern  Vaccinations discussed    2. Centrilobular emphysema  -     Fluticasone-Salmeterol (ADVAIR/WIXELA) 250-50 MCG/ACT DISKUS; Inhale 1 puff 2 (Two) Times a Day.  Dispense: 60 each; Refill: 11  -     albuterol sulfate  (90 Base) MCG/ACT inhaler; Inhale 2 puffs Every 4 (Four) Hours As Needed for Wheezing or Shortness of Air.  Dispense: 6.7 g; Refill: 11    3. Cigarette smoker  -      CT Chest Low Dose Cancer Screening WO; Future    4. Family history of lung cancer  -     Alpha - 1 - Antitrypsin Phenotype; Future    5. Encounter for screening for lung cancer  -      CT Chest Low Dose Cancer Screening WO; Future    6. Screening for deficiency anemia  -     CBC Auto Differential; Future    7. Screening for thyroid disorder  -     TSH Rfx On Abnormal To Free T4; Future    8. Screening for diabetes mellitus  -     Comprehensive Metabolic Panel; Future    9. Screening for cholesterol level  -     Lipid Panel; Future    10. Prostate cancer screening  -     PSA  Screen; Future    11. Screen for colon cancer  -     Cologuard - Stool, Per Rectum; Future    12. Encounter for hepatitis C screening test for low risk patient  -     Hepatitis C Antibody; Future         Plan of care reviewed with patient at the conclusion of today's visit. Education was provided regarding nutrition , exercise, supplements, preventative screenings, vaccinations, and COPD  diagnosis, management and any prescribed or recommended OTC medications.  Patient verbalizes understanding of and agreement with management plan.    Dictated Utilizing Dragon Dictation     Please note that portions of this note were completed with a voice recognition program.     Part of this note may be an electronic transcription/translation of spoken language to printed text using the Dragon Dictation System.    Return in about 1 year (around 11/7/2025) for Annual physical.     Rosanne Lynch PA-C

## 2024-11-11 LAB
A1AT PHENOTYP SERPL IFE: NORMAL
A1AT SERPL-MCNC: 155 MG/DL (ref 101–187)

## 2025-03-31 ENCOUNTER — TELEPHONE (OUTPATIENT)
Dept: FAMILY MEDICINE CLINIC | Facility: CLINIC | Age: 50
End: 2025-03-31
Payer: COMMERCIAL

## 2025-03-31 DIAGNOSIS — J43.2 CENTRILOBULAR EMPHYSEMA: ICD-10-CM

## 2025-03-31 NOTE — TELEPHONE ENCOUNTER
Caller Name: Akash Lundy      Relationship: Self      Best Contact Number: [phone]       Patient is requesting samples of   albuterol sulfate  (90 Base) MCG/ACT inhaler            How many days of medication do you have left? NONE        Additional Information: PATIENT CANNOT AFFORD THIS MEDICATION AT THIS TIME

## 2025-03-31 NOTE — TELEPHONE ENCOUNTER
We don't have samples of it since it is generic.  He can call his insurance to find out if it is cheaper with a mail order pharmacy or if they recommend something else?